# Patient Record
(demographics unavailable — no encounter records)

---

## 2024-10-30 NOTE — PHYSICAL EXAM
[FreeTextEntry1] : Alert, NAD. HC 46 cm. AFOF. Left parietal boss more prominent than right side. No ridging over sutures. Heart sounds NL. Neck FROM. Back NL. Abdomen soft, no masses. Hypotonic mouth. No head lag. Muscle bulk is NL and symmetric. PERRL, EOMI, face symmetric, hearing intact. Proximal LE tone is increased. UE tone is NL. Power, sensation, DTRs NL. No nystagmus or tremor.

## 2024-10-30 NOTE — DISCUSSION/SUMMARY
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 Refer pt for helmet as per mom's request. Refer pt for PT via EI. Will get MRI brain and EEG. RTO in 4 months. Rx written for chloral hydrate 650 mg with 1 refill. Note sent to Dr Cruz(PCP). Total clinician time spent on 10/30/2024 is 48 minutes including preparing to see the patient, obtaining and/or reviewing and confirming history, performing a medically necessary and appropriate examination, counseling and educating the patient and/or family, documenting clinical information in the EHR and communicating and/or referring to other healthcare professionals.

## 2024-10-30 NOTE — CONSULT LETTER
[Dear  ___] : Dear  [unfilled], [Please see my note below.] : Please see my note below. [Sincerely,] : Sincerely, [FreeTextEntry1] : Thank you for sending  LEONORFABBY HUSEYIN  to me for neurological evaluation. This is an initial encounter with a new pt. [FreeTextEntry3] : Dr Connors

## 2024-10-30 NOTE — HISTORY OF PRESENT ILLNESS
[FreeTextEntry1] : 8.5 month old male with motor delay and asymmetric skull shape. Mom worried about the pt's left parietal "lump" as she describes it. It has been there since birth. Pt smiles, laughs, fixates, tracks, reaches equally, rolls over and babbles. Not yet sitting up. PMH -ve. On no meds. NKA. FMH +ve for epilepsy in mother. No FMH of hydrocephalus. Birth: FTNSVD no complications. [place]  used (Willy, ID#: [id number]).

## 2025-02-05 NOTE — DISCUSSION/SUMMARY
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 Continue PT and OT. RTO in 6 months. Will repeat an MRI scan in 1 year. Note sent to Dr Cruz(PCP). Total clinician time spent on 2/5/2025 is 33 minutes including preparing to see the patient, obtaining and/or reviewing and confirming history, performing a medically necessary and appropriate examination, counseling and educating the patient and/or family, documenting clinical information in the EHR and communicating and/or referring to other healthcare professionals.

## 2025-02-05 NOTE — PHYSICAL EXAM
[FreeTextEntry1] : Alert, NAD. HC 46.75 cm(following normative curves). Heart sounds NL. Neck FROM. Back NL. Abdomen soft, no masses. Tone mildly depressed. Muscle bulk is NL and symmetric. PERRL, EOMI, face symmetric, hearing intact. Power, sensation, DTRs NL. No nystagmus or tremor.

## 2025-02-05 NOTE — CONSULT LETTER
[Dear  ___] : Dear  [unfilled], [Please see my note below.] : Please see my note below. [Sincerely,] : Sincerely, [FreeTextEntry1] : This is an update on JONNATHAN FONTANEZ  who I saw in the office today for a follow up. This is continuing active treatment of an existing pt. [FreeTextEntry3] : Dr Connors

## 2025-02-05 NOTE — HISTORY OF PRESENT ILLNESS
[FreeTextEntry1] : 12 month old male seen on 10/30/24 with motor delay and asymmetric skull shape. Pt smiles, laughs, fixates, tracks, reaches equally, rolls over and babbles. He is now sitting up and can stand when placed in that position. Gets PT an dOT via EI. PMH -ve. On no meds. NKA. FMH +ve for epilepsy in mother. No FMH of hydrocephalus. Birth: FTNSVD no complications. EEG is NL. MRI brain (12/20/24) shows features of benign "external" hydrocephalus. [place]  used (Mahad, ID#: [id number] ).